# Patient Record
Sex: FEMALE | URBAN - METROPOLITAN AREA
[De-identification: names, ages, dates, MRNs, and addresses within clinical notes are randomized per-mention and may not be internally consistent; named-entity substitution may affect disease eponyms.]

---

## 2017-09-20 ENCOUNTER — OFFICE VISIT (OUTPATIENT)
Dept: FAMILY MEDICINE CLINIC | Facility: CLINIC | Age: 27
End: 2017-09-20

## 2017-09-20 VITALS
RESPIRATION RATE: 16 BRPM | TEMPERATURE: 98 F | BODY MASS INDEX: 23.74 KG/M2 | HEIGHT: 63 IN | SYSTOLIC BLOOD PRESSURE: 124 MMHG | DIASTOLIC BLOOD PRESSURE: 78 MMHG | HEART RATE: 78 BPM | WEIGHT: 134 LBS

## 2017-09-20 DIAGNOSIS — N30.00 ACUTE CYSTITIS WITHOUT HEMATURIA: Primary | ICD-10-CM

## 2017-09-20 LAB
MULTISTIX EXPIRATION DATE: NORMAL DATE
MULTISTIX LOT#: NORMAL NUMERIC
PH, URINE: 6.5 (ref 4.5–8)
SPECIFIC GRAVITY: 1.01 (ref 1–1.03)
URINE-COLOR: YELLOW
UROBILINOGEN,SEMI-QN: 0.2 MG/DL (ref 0–1.9)

## 2017-09-20 PROCEDURE — 87147 CULTURE TYPE IMMUNOLOGIC: CPT | Performed by: PHYSICIAN ASSISTANT

## 2017-09-20 PROCEDURE — 87086 URINE CULTURE/COLONY COUNT: CPT | Performed by: PHYSICIAN ASSISTANT

## 2017-09-20 PROCEDURE — 99203 OFFICE O/P NEW LOW 30 MIN: CPT | Performed by: PHYSICIAN ASSISTANT

## 2017-09-20 PROCEDURE — 81003 URINALYSIS AUTO W/O SCOPE: CPT | Performed by: PHYSICIAN ASSISTANT

## 2017-09-20 RX ORDER — SULFAMETHOXAZOLE AND TRIMETHOPRIM 800; 160 MG/1; MG/1
1 TABLET ORAL 2 TIMES DAILY
Qty: 10 TABLET | Refills: 0 | Status: SHIPPED | OUTPATIENT
Start: 2017-09-20 | End: 2017-10-25

## 2017-09-20 NOTE — PROGRESS NOTES
HPI:   Keshawn Morales is a 32year old female who presents with UTI symptoms. She is a new patient to the practice. Pt moved here from [facility] 1 year ago. Is leaving tomorrow for [place] on a business trip. Symptoms started 1 week ago.  Drank a lot of mucosa  GI: good BS's,no masses, no HSM, no suprapubic tenderness  BACK: no CVA tenderness bilaterally  URINE: moderate blood, small leukocytes    ASSESSMENT AND PLAN:   Keshawn Morales is a 32year old female here with a UTI  Check urine culture  Bactrim D

## 2017-09-27 ENCOUNTER — TELEPHONE (OUTPATIENT)
Dept: FAMILY MEDICINE CLINIC | Facility: CLINIC | Age: 27
End: 2017-09-27

## 2017-09-27 NOTE — TELEPHONE ENCOUNTER
Spoke with patient that card that she supplied us was not going through with coverage. She explained she has not been able to call the insurance company because she has been abroad. She will call us with the correct information when she gets it.   She is

## 2017-10-25 ENCOUNTER — OFFICE VISIT (OUTPATIENT)
Dept: FAMILY MEDICINE CLINIC | Facility: CLINIC | Age: 27
End: 2017-10-25

## 2017-10-25 VITALS
SYSTOLIC BLOOD PRESSURE: 118 MMHG | DIASTOLIC BLOOD PRESSURE: 74 MMHG | BODY MASS INDEX: 23.92 KG/M2 | TEMPERATURE: 99 F | HEIGHT: 63 IN | RESPIRATION RATE: 16 BRPM | HEART RATE: 76 BPM | WEIGHT: 135 LBS

## 2017-10-25 DIAGNOSIS — N30.00 ACUTE CYSTITIS WITHOUT HEMATURIA: Primary | ICD-10-CM

## 2017-10-25 PROCEDURE — 87086 URINE CULTURE/COLONY COUNT: CPT | Performed by: PHYSICIAN ASSISTANT

## 2017-10-25 PROCEDURE — 99213 OFFICE O/P EST LOW 20 MIN: CPT | Performed by: PHYSICIAN ASSISTANT

## 2017-10-25 PROCEDURE — 81003 URINALYSIS AUTO W/O SCOPE: CPT | Performed by: PHYSICIAN ASSISTANT

## 2017-10-25 RX ORDER — NITROFURANTOIN 25; 75 MG/1; MG/1
100 CAPSULE ORAL 2 TIMES DAILY
Qty: 14 CAPSULE | Refills: 0 | Status: SHIPPED | OUTPATIENT
Start: 2017-10-25 | End: 2018-05-07

## 2017-10-25 NOTE — PROGRESS NOTES
HPI:   Keshawn Morales is a 32year old female who presents with UTI symptoms. Was treated for UTI in September. Urine culture showed mixture of gram positive organisms which were thought to be contamination and <10,000 group B strep.  Pt initially treated w leukocytes, negative blood, negative nitrites    ASSESSMENT AND PLAN:   Keshwan Morales is a 32year old female here with a UTI  Check urine culture. Nitrofurantoin BID for 7 days. Drink plenty of water, cranberry juice okay.   Avoid holding bladder, urina

## 2018-05-07 ENCOUNTER — OFFICE VISIT (OUTPATIENT)
Dept: FAMILY MEDICINE CLINIC | Facility: CLINIC | Age: 28
End: 2018-05-07

## 2018-05-07 VITALS
TEMPERATURE: 99 F | BODY MASS INDEX: 23.85 KG/M2 | RESPIRATION RATE: 18 BRPM | SYSTOLIC BLOOD PRESSURE: 112 MMHG | HEIGHT: 63.78 IN | HEART RATE: 86 BPM | DIASTOLIC BLOOD PRESSURE: 64 MMHG | WEIGHT: 138 LBS | OXYGEN SATURATION: 98 %

## 2018-05-07 DIAGNOSIS — H00.14 CHALAZION OF LEFT UPPER EYELID: Primary | ICD-10-CM

## 2018-05-07 PROCEDURE — 99213 OFFICE O/P EST LOW 20 MIN: CPT | Performed by: NURSE PRACTITIONER

## 2018-05-07 RX ORDER — TOBRAMYCIN 3 MG/ML
1 SOLUTION/ DROPS OPHTHALMIC EVERY 4 HOURS
Qty: 5 ML | Refills: 0 | Status: SHIPPED | OUTPATIENT
Start: 2018-05-07 | End: 2018-05-14

## 2018-05-07 NOTE — PROGRESS NOTES
CHIEF COMPLAINT:   Patient presents with:  Eye Problem: left eye lid redness and swelling       HPI:   Keshawn Morales is a 32year old female who presents with chief complaint of eye irritation. Symptoms began 1  days ago.  Symptoms have been persistent sin CARDIO: RRR without murmur  LYMPH: no preauricular lymphadenopathy.  No cervical lymphadenopathy  PSYCH: pleasant mood and affect  NEURO: no focal deficits    ASSESSMENT AND PLAN:   Keshawn Morales is a 32year old female who presents with:    ASSESSMENT: If your healthcare provider finds that a chalazion is infected, he or she may prescribe an antibiotic drop or ointment. Use the medicine as directed. · Wash your hands carefully with soap and warm water before and after caring for your eye(s).  This is to · You have a fever of 100.4ºF (38ºC) or higher, or as directed by your healthcare provider  Date Last Reviewed: 10/9/2015  © 1294-7136 [employer]. [address] [facility] [place], [address] [place] [address]. All rights reserved.  This information is not intend

## 2018-05-07 NOTE — PATIENT INSTRUCTIONS
Chalazion    A chalazion is a blocked, swollen oil gland in the eyelid. The eyelids have oil glands that lubricate the inside of the lids. If a gland becomes blocked, the oil builds up and causes the skin to swell.   A chalazion can take several weeks to · Once a day, with eyes closed, clean your eyelids with baby shampoo or a moist eyelid cleansing wipe. This is to help reduce clogging of the duct, as well as help prevent a chalazion from returning.  Ask your health care provider about products to clean yo

## 2018-05-09 ENCOUNTER — OFFICE VISIT (OUTPATIENT)
Dept: FAMILY MEDICINE CLINIC | Facility: CLINIC | Age: 28
End: 2018-05-09

## 2018-05-09 VITALS
RESPIRATION RATE: 18 BRPM | SYSTOLIC BLOOD PRESSURE: 112 MMHG | HEIGHT: 63.78 IN | BODY MASS INDEX: 23.68 KG/M2 | OXYGEN SATURATION: 99 % | TEMPERATURE: 99 F | DIASTOLIC BLOOD PRESSURE: 68 MMHG | WEIGHT: 137 LBS | HEART RATE: 86 BPM

## 2018-05-09 DIAGNOSIS — H00.14 CHALAZION OF LEFT UPPER EYELID: Primary | ICD-10-CM

## 2018-05-09 PROCEDURE — 99212 OFFICE O/P EST SF 10 MIN: CPT | Performed by: NURSE PRACTITIONER

## 2018-05-09 RX ORDER — ERYTHROMYCIN 5 MG/G
1 OINTMENT OPHTHALMIC 2 TIMES DAILY
Qty: 1 G | Refills: 0 | Status: SHIPPED | OUTPATIENT
Start: 2018-05-09 | End: 2018-05-16

## 2018-05-09 NOTE — PROGRESS NOTES
CHIEF COMPLAINT:   Patient presents with:  Eye Problem: pt started eye drops yesterday and said her eye is not better from stye diagnosis 2 days ago    HPI:   Keshawn Morales is a 32year old female who presents with seen 2 days ago diagnosed with Chalazion EYES: PERRLA, EOMI, bilat conjunctiva not erythematous, no discharge, small chalazion noted to left upper lid with minimal swelling and redness.   HENT: atraumatic, normocephalic,ears and throat are clear  NECK: supple, non tender  LUNGS: clear to auscultat A chalazion can take several weeks to grow. It can vary in size. It may appear on the inside or outside of the lid. In most cases, it occurs on the upper lid. The skin may be a normal color or may be red.  A chalazion is usually not painful, but it can caus Follow-up care  Follow up with your health care provider, or as advised.  If the chalazion does not heal in 4 weeks, you may be referred to a healthcare provider who specializes in eye care (an optometrist or ophthalmologist) for further evaluation and cleo

## 2018-06-26 ENCOUNTER — OFFICE VISIT (OUTPATIENT)
Dept: FAMILY MEDICINE CLINIC | Facility: CLINIC | Age: 28
End: 2018-06-26

## 2018-06-26 VITALS
HEIGHT: 63 IN | DIASTOLIC BLOOD PRESSURE: 70 MMHG | SYSTOLIC BLOOD PRESSURE: 105 MMHG | HEART RATE: 86 BPM | RESPIRATION RATE: 16 BRPM | WEIGHT: 136 LBS | BODY MASS INDEX: 24.1 KG/M2 | OXYGEN SATURATION: 98 % | TEMPERATURE: 99 F

## 2018-06-26 DIAGNOSIS — J02.9 SORE THROAT: ICD-10-CM

## 2018-06-26 DIAGNOSIS — J06.9 VIRAL URI WITH COUGH: Primary | ICD-10-CM

## 2018-06-26 DIAGNOSIS — J02.9 ACUTE VIRAL PHARYNGITIS: ICD-10-CM

## 2018-06-26 PROCEDURE — 87880 STREP A ASSAY W/OPTIC: CPT | Performed by: NURSE PRACTITIONER

## 2018-06-26 PROCEDURE — 99213 OFFICE O/P EST LOW 20 MIN: CPT | Performed by: NURSE PRACTITIONER

## 2018-06-26 NOTE — PROGRESS NOTES
CHIEF COMPLAINT:   Patient presents with:  URI: sore throat,cough,post nasal drip sx x 6 days        HPI:   Keshawn Morales is a 29year old female presents to clinic with complaint of sore throat. Patient has had 5 days.  Symptoms have been persisting sin LUNGS: clear to auscultation bilaterally, no wheezes or rhonchi. Breathing is non labored.   CARDIO: RRR without murmur  GI: good BS's,no masses, hepatosplenomegaly, or tenderness on direct palpation  EXTREMITIES: no cyanosis, clubbing or edema  LYMPH: No a You have a viral upper respiratory illness (URI), which is another term for the common cold. This illness is contagious during the first few days. It is spread through the air by coughing and sneezing.  It may also be spread by direct contact (touching the · Cough with lots of colored sputum (mucus)  · Severe headache; face, neck, or ear pain  · Difficulty swallowing due to throat pain  · Fever of 100.4°F (38°C) or higher, or as directed by your healthcare provider  Call 911  Call 911 if any of these occur: